# Patient Record
Sex: MALE | Race: WHITE | URBAN - METROPOLITAN AREA
[De-identification: names, ages, dates, MRNs, and addresses within clinical notes are randomized per-mention and may not be internally consistent; named-entity substitution may affect disease eponyms.]

---

## 2023-01-11 ENCOUNTER — OFFICE VISIT (OUTPATIENT)
Dept: INTERNAL MEDICINE CLINIC | Facility: CLINIC | Age: 38
End: 2023-01-11
Payer: COMMERCIAL

## 2023-01-11 VITALS
DIASTOLIC BLOOD PRESSURE: 68 MMHG | HEART RATE: 78 BPM | HEIGHT: 68 IN | BODY MASS INDEX: 28.61 KG/M2 | RESPIRATION RATE: 18 BRPM | SYSTOLIC BLOOD PRESSURE: 124 MMHG | WEIGHT: 188.81 LBS | OXYGEN SATURATION: 97 %

## 2023-01-11 DIAGNOSIS — Z13.1 DIABETES MELLITUS SCREENING: ICD-10-CM

## 2023-01-11 DIAGNOSIS — Z13.0 SCREENING FOR BLOOD DISEASE: ICD-10-CM

## 2023-01-11 DIAGNOSIS — F33.42 RECURRENT MAJOR DEPRESSIVE DISORDER, IN FULL REMISSION (HCC): ICD-10-CM

## 2023-01-11 DIAGNOSIS — Z13.29 THYROID DISORDER SCREEN: ICD-10-CM

## 2023-01-11 DIAGNOSIS — R21 RASH AND NONSPECIFIC SKIN ERUPTION: ICD-10-CM

## 2023-01-11 DIAGNOSIS — Z13.220 LIPID SCREENING: ICD-10-CM

## 2023-01-11 DIAGNOSIS — F41.1 GENERALIZED ANXIETY DISORDER: ICD-10-CM

## 2023-01-11 DIAGNOSIS — Z00.00 ANNUAL PHYSICAL EXAM: Primary | ICD-10-CM

## 2023-01-11 PROCEDURE — 3008F BODY MASS INDEX DOCD: CPT | Performed by: PHYSICIAN ASSISTANT

## 2023-01-11 PROCEDURE — 3074F SYST BP LT 130 MM HG: CPT | Performed by: PHYSICIAN ASSISTANT

## 2023-01-11 PROCEDURE — 3078F DIAST BP <80 MM HG: CPT | Performed by: PHYSICIAN ASSISTANT

## 2023-01-11 PROCEDURE — 99385 PREV VISIT NEW AGE 18-39: CPT | Performed by: PHYSICIAN ASSISTANT

## 2023-01-11 PROCEDURE — 99202 OFFICE O/P NEW SF 15 MIN: CPT | Performed by: PHYSICIAN ASSISTANT

## 2023-01-11 RX ORDER — DULOXETIN HYDROCHLORIDE 60 MG/1
60 CAPSULE, DELAYED RELEASE ORAL 2 TIMES DAILY
COMMUNITY
Start: 2022-12-23

## 2023-01-11 RX ORDER — CLOTRIMAZOLE AND BETAMETHASONE DIPROPIONATE 10; .64 MG/G; MG/G
1 CREAM TOPICAL 2 TIMES DAILY
Qty: 45 G | Refills: 0 | Status: SHIPPED | OUTPATIENT
Start: 2023-01-11

## 2023-01-11 RX ORDER — BUSPIRONE HYDROCHLORIDE 10 MG/1
10 TABLET ORAL 2 TIMES DAILY
COMMUNITY
Start: 2022-12-23

## 2023-10-18 ENCOUNTER — TELEPHONE (OUTPATIENT)
Dept: INTERNAL MEDICINE CLINIC | Facility: CLINIC | Age: 38
End: 2023-10-18

## 2023-10-18 NOTE — TELEPHONE ENCOUNTER
Orders to     Michelle Sanchez        Pt aware to get labs done no sooner than 2 weeks prior to the appt. Pt aware to fast.  No call back required.     Future Appointments   Date Time Provider Kalyani Garcia   12/6/2023 10:30 AM Alisha Lopez PA-C [facility]

## 2023-10-18 NOTE — TELEPHONE ENCOUNTER
[facility] calling for refills for pt of below. [address] [phone], [phone] [[id number]]             Medication Quantity Refills Start End   busPIRone 10 MG Oral Tab   12/23/2022    Sig:   Take 10 mg by mouth 2 (two) times daily. Route:   Oral              DULoxetine 60 MG Oral Cap DR Particles   12/23/2022    Sig:   Take 60 mg by mouth 2 (two) times daily.      Route:   Oral

## 2023-10-19 ENCOUNTER — PATIENT MESSAGE (OUTPATIENT)
Dept: INTERNAL MEDICINE CLINIC | Facility: CLINIC | Age: 38
End: 2023-10-19

## 2023-10-19 RX ORDER — DULOXETIN HYDROCHLORIDE 60 MG/1
60 CAPSULE, DELAYED RELEASE ORAL 2 TIMES DAILY
Qty: 180 CAPSULE | Refills: 0 | Status: SHIPPED | OUTPATIENT
Start: 2023-10-19

## 2023-10-19 RX ORDER — BUSPIRONE HYDROCHLORIDE 10 MG/1
10 TABLET ORAL 2 TIMES DAILY
Qty: 180 TABLET | Refills: 0 | OUTPATIENT
Start: 2023-10-19

## 2023-10-19 RX ORDER — DULOXETIN HYDROCHLORIDE 60 MG/1
60 CAPSULE, DELAYED RELEASE ORAL 2 TIMES DAILY
Refills: 0 | Status: CANCELLED | OUTPATIENT
Start: 2023-10-19

## 2023-10-19 RX ORDER — DULOXETIN HYDROCHLORIDE 60 MG/1
60 CAPSULE, DELAYED RELEASE ORAL 2 TIMES DAILY
Qty: 180 CAPSULE | Refills: 0 | OUTPATIENT
Start: 2023-10-19

## 2023-10-19 RX ORDER — BUSPIRONE HYDROCHLORIDE 10 MG/1
10 TABLET ORAL 2 TIMES DAILY
Qty: 180 TABLET | Refills: 0 | Status: SHIPPED | OUTPATIENT
Start: 2023-10-19

## 2023-10-19 RX ORDER — BUSPIRONE HYDROCHLORIDE 10 MG/1
10 TABLET ORAL 2 TIMES DAILY
Refills: 0 | Status: CANCELLED | OUTPATIENT
Start: 2023-10-19

## 2023-10-19 NOTE — TELEPHONE ENCOUNTER
From: Monica Iglesias  To: Oziel Cardenas  Sent: 10/19/2023 9:36 AM CDT  Subject: medication    Hello, I have submitted a medication request and it automatically went to S[facility] Castro. I wanted to let you know because you are the Dr that I see. I wanted to make sure it got seen because I am about to run out of this medication. Thank you!

## 2023-10-19 NOTE — TELEPHONE ENCOUNTER
Refills have been requested for the following medications:         DULoxetine 60 MG Oral Cap DR Particles      Patient Comment: Old doctor was from [place] and new one is [place] so pharmacy said call the doctor doctor said have pharmacy submit refill request pharmacy said they would call. Waited 24 hours and now pharmacy says submit through [address] and say they can't contact because old doctor is out of state I think. Not sure if this is what was meant?         busPIRone 10 MG Oral Tab      Patient Comment: Old doctor was from [place] and new one is [place] so pharmacy said call the doctor doctor said have pharmacy submit refill request pharmacy said they would call. Waited 24 hours and now pharmacy says submit through [address] and say they can't contact because old doctor is out of state I think. Not sure if this is what was meant?      Preferred pharmacy: [facility] [address], [address] [phone], [phone]

## 2023-10-20 RX ORDER — DULOXETIN HYDROCHLORIDE 60 MG/1
60 CAPSULE, DELAYED RELEASE ORAL 2 TIMES DAILY
Qty: 180 CAPSULE | Refills: 0 | OUTPATIENT
Start: 2023-10-20 | End: 2024-01-18

## 2023-10-20 RX ORDER — BUSPIRONE HYDROCHLORIDE 10 MG/1
10 TABLET ORAL 3 TIMES DAILY
Qty: 270 TABLET | Refills: 0 | OUTPATIENT
Start: 2023-10-20 | End: 2024-10-14

## 2024-01-08 ENCOUNTER — TELEPHONE (OUTPATIENT)
Dept: INTERNAL MEDICINE CLINIC | Facility: CLINIC | Age: 39
End: 2024-01-08

## 2024-01-08 DIAGNOSIS — Z13.220 SCREENING FOR LIPID DISORDERS: ICD-10-CM

## 2024-01-08 DIAGNOSIS — Z13.228 SCREENING FOR METABOLIC DISORDER: ICD-10-CM

## 2024-01-08 DIAGNOSIS — Z13.0 SCREENING FOR DISORDER OF BLOOD AND BLOOD-FORMING ORGANS: ICD-10-CM

## 2024-01-08 DIAGNOSIS — Z13.29 SCREENING FOR THYROID DISORDER: ICD-10-CM

## 2024-01-08 DIAGNOSIS — Z00.00 ROUTINE GENERAL MEDICAL EXAMINATION AT A HEALTH CARE FACILITY: Primary | ICD-10-CM

## 2024-01-17 ENCOUNTER — OFFICE VISIT (OUTPATIENT)
Dept: INTERNAL MEDICINE CLINIC | Facility: CLINIC | Age: 39
End: 2024-01-17
Payer: COMMERCIAL

## 2024-01-17 VITALS
HEIGHT: 67 IN | WEIGHT: 196 LBS | OXYGEN SATURATION: 98 % | BODY MASS INDEX: 30.76 KG/M2 | SYSTOLIC BLOOD PRESSURE: 124 MMHG | HEART RATE: 102 BPM | RESPIRATION RATE: 22 BRPM | DIASTOLIC BLOOD PRESSURE: 84 MMHG

## 2024-01-17 DIAGNOSIS — Z00.00 ANNUAL PHYSICAL EXAM: Primary | ICD-10-CM

## 2024-01-17 DIAGNOSIS — F33.42 RECURRENT MAJOR DEPRESSIVE DISORDER, IN FULL REMISSION (HCC): ICD-10-CM

## 2024-01-17 DIAGNOSIS — Z23 NEED FOR INFLUENZA VACCINATION: ICD-10-CM

## 2024-01-17 DIAGNOSIS — Z23 NEED FOR TDAP VACCINATION: ICD-10-CM

## 2024-01-17 DIAGNOSIS — F41.1 GENERALIZED ANXIETY DISORDER: ICD-10-CM

## 2024-01-17 PROCEDURE — 90686 IIV4 VACC NO PRSV 0.5 ML IM: CPT | Performed by: PHYSICIAN ASSISTANT

## 2024-01-17 PROCEDURE — 3008F BODY MASS INDEX DOCD: CPT | Performed by: PHYSICIAN ASSISTANT

## 2024-01-17 PROCEDURE — 90471 IMMUNIZATION ADMIN: CPT | Performed by: PHYSICIAN ASSISTANT

## 2024-01-17 PROCEDURE — 3074F SYST BP LT 130 MM HG: CPT | Performed by: PHYSICIAN ASSISTANT

## 2024-01-17 PROCEDURE — 90472 IMMUNIZATION ADMIN EACH ADD: CPT | Performed by: PHYSICIAN ASSISTANT

## 2024-01-17 PROCEDURE — 99395 PREV VISIT EST AGE 18-39: CPT | Performed by: PHYSICIAN ASSISTANT

## 2024-01-17 PROCEDURE — 90715 TDAP VACCINE 7 YRS/> IM: CPT | Performed by: PHYSICIAN ASSISTANT

## 2024-01-17 PROCEDURE — 3079F DIAST BP 80-89 MM HG: CPT | Performed by: PHYSICIAN ASSISTANT

## 2024-01-17 RX ORDER — BUSPIRONE HYDROCHLORIDE 10 MG/1
10 TABLET ORAL 2 TIMES DAILY
Qty: 180 TABLET | Refills: 3 | Status: SHIPPED | OUTPATIENT
Start: 2024-01-17

## 2024-01-17 RX ORDER — DULOXETIN HYDROCHLORIDE 60 MG/1
60 CAPSULE, DELAYED RELEASE ORAL 2 TIMES DAILY
Qty: 180 CAPSULE | Refills: 3 | Status: SHIPPED | OUTPATIENT
Start: 2024-01-17

## 2024-01-17 NOTE — PROGRESS NOTES
Chief Complaint   Patient presents with    Physical     Room # 6 KB    Care Gap Management       Due for tdap       HPI:  Pt presents for annual physical.      Anxiety and depression - pt is compliant with meds and denies issues.  He feels his sxs are well controlled.  Denies SEs with meds.    Pt is active but not exercising regularly.      Pt thinks he had a COVID booster this fall.  Had it before going on a cruise.  Will check with his wife.  Thinks he may have had a tetanus shot in the last 10 years but will check with wife.  Has not had flu shot yet this season.    Next child due in April, a boy.  Has 10 YO twin boys.     Has not done screening labs last year or this year yet.    Review of Systems   Constitutional: Negative for fever, chills and fatigue. No distress.  HENT: Negative for hearing loss, congestion, sore throat, neck pain and dental problem.    Eyes: Negative for pain and visual disturbance.   Respiratory: Negative for cough, chest tightness, shortness of breath and wheezing.    Cardiovascular: Negative for chest pain, palpitations and leg swelling.   Gastrointestinal: Negative for nausea, vomiting, abdominal pain, diarrhea, blood in stool   Genitourinary: Negative for dysuria, hematuria and difficulty urinating.   Musculoskeletal: Negative for myalgias, back pain, joint swelling, arthralgias and gait problem.   Skin: Negative for color change and rash.   Neurological: Negative for dizziness, syncope, weakness, numbness, tingling and headaches.   Hematological: Negative for adenopathy. Does not bruise/bleed easily.   Psychiatric/Behavioral: The patient is not nervous/anxious. No depression.    Patient Active Problem List   Diagnosis    Generalized anxiety disorder    Recurrent major depressive disorder, in full remission ([facility])       History reviewed. No pertinent past medical history.  History reviewed. No pertinent surgical history.  History reviewed. No pertinent family history.  Social History      Socioeconomic History    Marital status:    Tobacco Use    Smoking status: Never    Smokeless tobacco: Never       Current Outpatient Medications   Medication Sig Dispense Refill    DULoxetine 60 MG Oral Cap DR Particles Take 1 capsule (60 mg total) by mouth 2 (two) times daily. 180 capsule 3    busPIRone 10 MG Oral Tab Take 1 tablet (10 mg total) by mouth 2 (two) times daily. 180 tablet 3       Allergies  No Known Allergies    Health Maintenance  Immunization History   Administered Date(s) Administered    FLUZONE 6 months and older PFS 0.5 ml (31496) 01/17/2024    TDAP 01/17/2024     Dental Visits:  Overdue    Physical Exam  /84   Pulse 102   Resp 22   Ht 5' 7\" (1.702 m)   Wt 196 lb (88.9 kg)   SpO2 98%   BMI 30.70 kg/m²   Constitutional: Oriented to person, place, and time. No distress.   HEENT:  Normocephalic and atraumatic. Tympanic membranes normal.  Nose normal. Oropharynx is clear and moist.   Eyes: Conjunctivae are normal. PERRLA. No scleral icterus.   Neck: Normal range of motion. Neck supple. No carotid bruits bilaterally. No edema present.  Cardiovascular: Normal rate, regular rhythm and intact distal pulses.  No murmur, rubs or gallops.   Pulmonary/Chest: Effort normal and breath sounds normal. No respiratory distress. No wheezes, rhonchi or rales  Abdominal: Soft. Bowel sounds are normal. Non tender, no masses, no organomegaly or hernias.  :  Circumcised male, 2 descended testes.  No penile lesions or masses.  No testicular masses.  No hernias noted bilaterally  Musculoskeletal: Normal range of motion. No edema and no tenderness.  No effusions.  Lymphadenopathy: No cervical adenopathy.   Neurological: Normal reflexes. No cranial nerve deficit or sensory deficit. Normal muscle tone. Coordination normal.   Skin: Skin is warm and dry. No rash noted. No erythema. No pallor.   Psychiatric: Normal mood and affect.     A/P:    Encounter Diagnoses   Name Primary?    Annual physical exam  - will do screening labs today.  Tdap and flu shot today.  COVID booster already done per pt, asked him to send record through [employer] (states his wife has at home).  Encouraged regular cardiovascular exercise, ideally a minimum of 30 minutes 5 times a week.  Reviewed RFs for CVD and need to control those for prevention of CVD in the future.   Yes    Recurrent major depressive disorder, in full remission (HCC) - controlled on Cymbalta, continue.       Generalized anxiety disorder - controlled on Buspar, continue.     Need for influenza vaccination     Need for Tdap vaccination        Orders Placed This Encounter   Procedures    Fluzone Quadrivalent 6mo+ 0.5mL    TdaP (Adacel, Boostrix) [57352]       Meds & Refills for this Visit:  Requested Prescriptions     Signed Prescriptions Disp Refills    DULoxetine 60 MG Oral Cap DR Particles 180 capsule 3     Sig: Take 1 capsule (60 mg total) by mouth 2 (two) times daily.    busPIRone 10 MG Oral Tab 180 tablet 3     Sig: Take 1 tablet (10 mg total) by mouth 2 (two) times daily.       Imaging & Consults:  INFLUENZA VAC, QUAD, PRSV FREE, 0.5 ML  TETANUS, DIPHTHERIA TOXOIDS AND ACELLULAR PERTUSIS VACCINE (TDAP), >7 YEARS, IM USE      No follow-ups on file.    There are no Patient Instructions on file for this visit.    All questions were answered and the patient understands the plan.

## 2024-03-19 ENCOUNTER — LAB ENCOUNTER (OUTPATIENT)
Dept: LAB | Age: 39
End: 2024-03-19
Attending: PHYSICIAN ASSISTANT
Payer: COMMERCIAL

## 2024-03-19 DIAGNOSIS — Z00.00 ROUTINE GENERAL MEDICAL EXAMINATION AT A HEALTH CARE FACILITY: ICD-10-CM

## 2024-03-19 DIAGNOSIS — Z13.228 SCREENING FOR METABOLIC DISORDER: ICD-10-CM

## 2024-03-19 DIAGNOSIS — Z13.0 SCREENING FOR DISORDER OF BLOOD AND BLOOD-FORMING ORGANS: ICD-10-CM

## 2024-03-19 DIAGNOSIS — Z13.220 SCREENING FOR LIPID DISORDERS: ICD-10-CM

## 2024-03-19 DIAGNOSIS — Z13.29 SCREENING FOR THYROID DISORDER: ICD-10-CM

## 2024-03-19 LAB
ALBUMIN SERPL-MCNC: 4.6 G/DL (ref 3.4–5)
ALBUMIN/GLOB SERPL: 1.4 {RATIO} (ref 1–2)
ALP LIVER SERPL-CCNC: 67 U/L
ALT SERPL-CCNC: 65 U/L
ANION GAP SERPL CALC-SCNC: 2 MMOL/L (ref 0–18)
AST SERPL-CCNC: 30 U/L (ref 15–37)
BASOPHILS # BLD AUTO: 0.06 X10(3) UL (ref 0–0.2)
BASOPHILS NFR BLD AUTO: 0.7 %
BILIRUB SERPL-MCNC: 0.6 MG/DL (ref 0.1–2)
BUN BLD-MCNC: 16 MG/DL (ref 9–23)
CALCIUM BLD-MCNC: 9.4 MG/DL (ref 8.5–10.1)
CHLORIDE SERPL-SCNC: 105 MMOL/L (ref 98–112)
CHOLEST SERPL-MCNC: 278 MG/DL (ref ?–200)
CO2 SERPL-SCNC: 28 MMOL/L (ref 21–32)
CREAT BLD-MCNC: 1.24 MG/DL
EGFRCR SERPLBLD CKD-EPI 2021: 77 ML/MIN/1.73M2 (ref 60–?)
EOSINOPHIL # BLD AUTO: 0.21 X10(3) UL (ref 0–0.7)
EOSINOPHIL NFR BLD AUTO: 2.3 %
ERYTHROCYTE [DISTWIDTH] IN BLOOD BY AUTOMATED COUNT: 12 %
FASTING PATIENT LIPID ANSWER: YES
FASTING STATUS PATIENT QL REPORTED: YES
GLOBULIN PLAS-MCNC: 3.4 G/DL (ref 2.8–4.4)
GLUCOSE BLD-MCNC: 94 MG/DL (ref 70–99)
HCT VFR BLD AUTO: 49.5 %
HDLC SERPL-MCNC: 38 MG/DL (ref 40–59)
HGB BLD-MCNC: 17 G/DL
IMM GRANULOCYTES # BLD AUTO: 0.03 X10(3) UL (ref 0–1)
IMM GRANULOCYTES NFR BLD: 0.3 %
LDLC SERPL CALC-MCNC: 170 MG/DL (ref ?–100)
LYMPHOCYTES # BLD AUTO: 3.38 X10(3) UL (ref 1–4)
LYMPHOCYTES NFR BLD AUTO: 37.2 %
MCH RBC QN AUTO: 30.1 PG (ref 26–34)
MCHC RBC AUTO-ENTMCNC: 34.3 G/DL (ref 31–37)
MCV RBC AUTO: 87.8 FL
MONOCYTES # BLD AUTO: 0.66 X10(3) UL (ref 0.1–1)
MONOCYTES NFR BLD AUTO: 7.3 %
NEUTROPHILS # BLD AUTO: 4.74 X10 (3) UL (ref 1.5–7.7)
NEUTROPHILS # BLD AUTO: 4.74 X10(3) UL (ref 1.5–7.7)
NEUTROPHILS NFR BLD AUTO: 52.2 %
NONHDLC SERPL-MCNC: 240 MG/DL (ref ?–130)
OSMOLALITY SERPL CALC.SUM OF ELEC: 281 MOSM/KG (ref 275–295)
PLATELET # BLD AUTO: 320 10(3)UL (ref 150–450)
POTASSIUM SERPL-SCNC: 4 MMOL/L (ref 3.5–5.1)
PROT SERPL-MCNC: 8 G/DL (ref 6.4–8.2)
RBC # BLD AUTO: 5.64 X10(6)UL
SODIUM SERPL-SCNC: 135 MMOL/L (ref 136–145)
TRIGL SERPL-MCNC: 359 MG/DL (ref 30–149)
TSI SER-ACNC: 2.16 MIU/ML (ref 0.36–3.74)
VLDLC SERPL CALC-MCNC: 74 MG/DL (ref 0–30)
WBC # BLD AUTO: 9.1 X10(3) UL (ref 4–11)

## 2024-03-19 PROCEDURE — 80061 LIPID PANEL: CPT | Performed by: PHYSICIAN ASSISTANT

## 2024-03-19 PROCEDURE — 80050 GENERAL HEALTH PANEL: CPT | Performed by: PHYSICIAN ASSISTANT

## 2024-04-02 NOTE — PROGRESS NOTES
Chief Complaint   Patient presents with    Lab Results     Rm 6 kb         HPI:  Pt presents for follow up of abnormal labs.    Pt admits to poor diet.  Wife plans meals and pt does the cooking.  Pt has been trying to increase exercise with goal of 150 min weekly.   Weight trending up over the last year or so.  Down a pound from January OV.     Review of Systems   No f/c/chest pain or sob. No cough. No n/v/d. No ha or dizziness. No other complaints today.    No past medical history on file.    Patient Active Problem List   Diagnosis    Generalized anxiety disorder    Recurrent major depressive disorder, in full remission (HCC)       Current Outpatient Medications   Medication Sig Dispense Refill    DULoxetine 60 MG Oral Cap DR Particles Take 1 capsule (60 mg total) by mouth 2 (two) times daily. 180 capsule 3    busPIRone 10 MG Oral Tab Take 1 tablet (10 mg total) by mouth 2 (two) times daily. 180 tablet 3       Physical Exam  /62   Pulse 116   Resp 22   Ht 5' 7\" (1.702 m)   Wt 195 lb 3.2 oz (88.5 kg)   SpO2 98%   BMI 30.57 kg/m²   Constitutional:  No distress.   HEENT:  Normocephalic and atraumatic.  Skin: Skin is warm and dry. No rash noted. No erythema. No pallor.     [facility] Lab Encounter on 03/19/2024   Component Date Value Ref Range Status    Glucose 03/19/2024 94  70 - 99 mg/dL Final    Sodium 03/19/2024 135 (L)  136 - 145 mmol/L Final    Potassium 03/19/2024 4.0  3.5 - 5.1 mmol/L Final    Chloride 03/19/2024 105  98 - 112 mmol/L Final    CO2 03/19/2024 28.0  21.0 - 32.0 mmol/L Final    Anion Gap 03/19/2024 2  0 - 18 mmol/L Final    BUN 03/19/2024 16  9 - 23 mg/dL Final    Creatinine 03/19/2024 1.24  0.70 - 1.30 mg/dL Final    Calcium, Total 03/19/2024 9.4  8.5 - 10.1 mg/dL Final    Calculated Osmolality 03/19/2024 281  275 - 295 mOsm/kg Final    eGFR-Cr 03/19/2024 77  >=60 mL/min/1.73m2 Final    AST 03/19/2024 30  15 - 37 U/L Final    ALT 03/19/2024 65 (H)  16 - 61 U/L Final    Alkaline Phosphatase  03/19/2024 67  45 - 117 U/L Final    Bilirubin, Total 03/19/2024 0.6  0.1 - 2.0 mg/dL Final    Total Protein 03/19/2024 8.0  6.4 - 8.2 g/dL Final    Albumin 03/19/2024 4.6  3.4 - 5.0 g/dL Final    Globulin  03/19/2024 3.4  2.8 - 4.4 g/dL Final    A/G Ratio 03/19/2024 1.4  1.0 - 2.0 Final    Patient Fasting for CMP? 03/19/2024 Yes   Final    Cholesterol, Total 03/19/2024 278 (H)  <200 mg/dL Final    HDL Cholesterol 03/19/2024 38 (L)  40 - 59 mg/dL Final    Triglycerides 03/19/2024 359 (H)  30 - 149 mg/dL Final    LDL Cholesterol 03/19/2024 170 (H)  <100 mg/dL Final    VLDL 03/19/2024 74 (H)  0 - 30 mg/dL Final    Non HDL Chol 03/19/2024 240 (H)  <130 mg/dL Final    Patient Fasting for Lipid? 03/19/2024 Yes   Final    TSH 03/19/2024 2.160  0.358 - 3.740 mIU/mL Final    WBC 03/19/2024 9.1  4.0 - 11.0 x10(3) uL Final    RBC 03/19/2024 5.64  4.30 - 5.70 x10(6)uL Final    HGB 03/19/2024 17.0  13.0 - 17.5 g/dL Final    HCT 03/19/2024 49.5  39.0 - 53.0 % Final    PLT 03/19/2024 320.0  150.0 - 450.0 10(3)uL Final    MCV 03/19/2024 87.8  80.0 - 100.0 fL Final    MCH 03/19/2024 30.1  26.0 - 34.0 pg Final    MCHC 03/19/2024 34.3  31.0 - 37.0 g/dL Final    RDW 03/19/2024 12.0  % Final    Neutrophil Absolute Prelim 03/19/2024 4.74  1.50 - 7.70 x10 (3) uL Final    Neutrophil Absolute 03/19/2024 4.74  1.50 - 7.70 x10(3) uL Final    Lymphocyte Absolute 03/19/2024 3.38  1.00 - 4.00 x10(3) uL Final    Monocyte Absolute 03/19/2024 0.66  0.10 - 1.00 x10(3) uL Final    Eosinophil Absolute 03/19/2024 0.21  0.00 - 0.70 x10(3) uL Final    Basophil Absolute 03/19/2024 0.06  0.00 - 0.20 x10(3) uL Final    Immature Granulocyte Absolute 03/19/2024 0.03  0.00 - 1.00 x10(3) uL Final    Neutrophil % 03/19/2024 52.2  % Final    Lymphocyte % 03/19/2024 37.2  % Final    Monocyte % 03/19/2024 7.3  % Final    Eosinophil % 03/19/2024 2.3  % Final    Basophil % 03/19/2024 0.7  % Final    Immature Granulocyte % 03/19/2024 0.3  % Final          A/P:    Encounter Diagnoses   Name Primary?    Transaminitis - suspect fatty liver.  Discussed diet, exercise wt loss.  Repeat CMP in 3 mos as well as ferritin and acute hepatitis panel.  If remains elevated then will need to check abd US. Yes    Dyslipidemia - Pt reports not fasting for labs.  Pt will work on diet, exercise, wt loss.  Repeat labs in 3 mos.  If remains elevated then will need statin.  Refer to Dietitian.  Discussed diet choices that will improve chol.     Hyponatremia - mild, repeat with next labs.        Code selection for this visit was based on time spent (33 min) on date of service in preparing to see the patient, obtaining and/or reviewing separately obtained history, performing a medically appropriate examination, counseling and educating the patient/family/caregiver, ordering medications or testing, referring and communicating with other healthcare providers, documenting clinical information in the EHR, independently interpreting results and communicating results to the patient/family/caregiver and care coordination with the patient's other providers.      Orders Placed This Encounter   Procedures    Comp Metabolic Panel (14) [E]    Lipid Panel [E]    Ferritin [E]    Hepatitis Panel, Acute (4) [E]       Meds & Refills for this Visit:  Requested Prescriptions      No prescriptions requested or ordered in this encounter       Imaging & Consults:  DIETITIAN EDUCATION INITIAL, DIET (INTERNAL)    Return in about 9 months (around 1/3/2025), or if symptoms worsen or fail to improve, for Annual physical.  There are no Patient Instructions on file for this visit.    All questions were answered and the patient understands the plan.

## 2024-04-03 ENCOUNTER — OFFICE VISIT (OUTPATIENT)
Dept: INTERNAL MEDICINE CLINIC | Facility: CLINIC | Age: 39
End: 2024-04-03
Payer: COMMERCIAL

## 2024-04-03 VITALS
HEIGHT: 67 IN | WEIGHT: 195.19 LBS | OXYGEN SATURATION: 98 % | HEART RATE: 116 BPM | SYSTOLIC BLOOD PRESSURE: 118 MMHG | DIASTOLIC BLOOD PRESSURE: 62 MMHG | BODY MASS INDEX: 30.64 KG/M2 | RESPIRATION RATE: 22 BRPM

## 2024-04-03 DIAGNOSIS — R74.01 TRANSAMINITIS: Primary | ICD-10-CM

## 2024-04-03 DIAGNOSIS — E78.5 DYSLIPIDEMIA: ICD-10-CM

## 2024-04-03 DIAGNOSIS — E87.1 HYPONATREMIA: ICD-10-CM

## 2024-04-03 PROCEDURE — 3008F BODY MASS INDEX DOCD: CPT | Performed by: PHYSICIAN ASSISTANT

## 2024-04-03 PROCEDURE — 99214 OFFICE O/P EST MOD 30 MIN: CPT | Performed by: PHYSICIAN ASSISTANT

## 2024-04-03 PROCEDURE — 3078F DIAST BP <80 MM HG: CPT | Performed by: PHYSICIAN ASSISTANT

## 2024-04-03 PROCEDURE — 3074F SYST BP LT 130 MM HG: CPT | Performed by: PHYSICIAN ASSISTANT

## 2024-10-27 ENCOUNTER — APPOINTMENT (OUTPATIENT)
Dept: GENERAL RADIOLOGY | Age: 39
End: 2024-10-27
Attending: NURSE PRACTITIONER
Payer: COMMERCIAL

## 2024-10-27 ENCOUNTER — HOSPITAL ENCOUNTER (OUTPATIENT)
Age: 39
Discharge: HOME | End: 2024-10-27
Payer: COMMERCIAL

## 2024-10-27 VITALS
HEIGHT: 66 IN | WEIGHT: 200 LBS | SYSTOLIC BLOOD PRESSURE: 132 MMHG | TEMPERATURE: 98 F | HEART RATE: 103 BPM | RESPIRATION RATE: 18 BRPM | BODY MASS INDEX: 32.14 KG/M2 | DIASTOLIC BLOOD PRESSURE: 86 MMHG | OXYGEN SATURATION: 95 %

## 2024-10-27 DIAGNOSIS — J18.9 COMMUNITY ACQUIRED PNEUMONIA OF RIGHT MIDDLE LOBE OF LUNG: Primary | ICD-10-CM

## 2024-10-27 PROCEDURE — 71046 X-RAY EXAM CHEST 2 VIEWS: CPT | Performed by: NURSE PRACTITIONER

## 2024-10-27 PROCEDURE — 99204 OFFICE O/P NEW MOD 45 MIN: CPT | Performed by: NURSE PRACTITIONER

## 2024-10-27 RX ORDER — AZITHROMYCIN 250 MG/1
TABLET, FILM COATED ORAL
Qty: 6 TABLET | Refills: 0 | Status: SHIPPED | OUTPATIENT
Start: 2024-10-27 | End: 2024-11-01

## 2024-10-27 NOTE — ED PROVIDER NOTES
Patient Seen in: [facility]      History     Chief Complaint   Patient presents with    Fever    Cough    Shortness Of Breath    Body ache and/or chills     Stated Complaint: shortness of breath; fever; cough; sore throat chills    Subjective:   38-year-old male presents today with flulike symptoms for about 6 to 7 days.  States kids at home have had similar symptoms.  Denies any shortness breath wheezing.  No nausea vomiting diarrhea.  Is afebrile on arrival here.  No other symptoms or concerns.  The patient's medication list, past medical history and social history elements as listed in today's nurse's notes were reviewed and agreed (except as otherwise stated in the HPI).  The patient's family history reviewed and determined to be noncontributory to the presenting problem              Objective:     History reviewed. No pertinent past medical history.           History reviewed. No pertinent surgical history.             Social History     Socioeconomic History    Marital status:    Tobacco Use    Smoking status: Never    Smokeless tobacco: Never              Review of Systems    Positive for stated complaint: shortness of breath; fever; cough; sore throat chills  Other systems are as noted in HPI.  Constitutional and vital signs reviewed.      All other systems reviewed and negative except as noted above.    Physical Exam     ED Triage Vitals [10/27/24 1245]   /86   Pulse 103   Resp 18   Temp 98.1 °F (36.7 °C)   Temp src Temporal   SpO2 95 %   O2 Device None (Room air)       Current Vitals:   Vital Signs  BP: 132/86  Pulse: 103  Resp: 18  Temp: 98.1 °F (36.7 °C)  Temp src: Temporal    Oxygen Therapy  SpO2: 95 %  O2 Device: None (Room air)        Physical Exam  Vitals and nursing note reviewed.   Constitutional:       Appearance: He is well-developed.   HENT:      Head: Normocephalic.      Right Ear: Tympanic membrane and ear canal normal.      Left Ear: Tympanic membrane and ear canal  normal.      Nose: Mucosal edema, congestion and rhinorrhea present.      Mouth/Throat:      Pharynx: Uvula midline. Posterior oropharyngeal erythema present.   Eyes:      Conjunctiva/sclera: Conjunctivae normal.      Pupils: Pupils are equal, round, and reactive to light.   Cardiovascular:      Rate and Rhythm: Normal rate and regular rhythm.   Pulmonary:      Effort: Pulmonary effort is normal.      Breath sounds: Normal breath sounds.   Musculoskeletal:      Cervical back: Normal range of motion and neck supple.   Skin:     General: Skin is warm and dry.   Neurological:      Mental Status: He is alert and oriented to person, place, and time.             ED Course   Labs Reviewed - No data to display         XR CHEST PA + LAT CHEST (CPT=71046)    Result Date: 10/27/2024  PROCEDURE:  XR CHEST PA + LAT CHEST (CPT=71046)  INDICATIONS:  shortness of breath; fever; cough; sore throat chills  COMPARISON:  None.  TECHNIQUE:  PA and lateral chest radiographs were obtained.  PATIENT STATED HISTORY: (As transcribed by Technologist)  Patient with  shortness of breath, fever, cough, sore throat and chills for a week. He has a child with pneumonia at home.              CONCLUSION:    Focal pneumonia with a focal consolidation in the posterior aspect superior segment right lower lobe.  Interstitial markings mildly accentuated bilaterally.  Normal heart size.  No pleural effusion or pneumothorax.  No hyperinflation.  No mediastinal widening.  Chest x-ray follow-up should be changed to show clearance of this and exclude any underlying central lung lesion. LOCATION:  [id number]   Dictated by (CST): Toño Nevarez MD on 10/27/2024 at 1:24 PM     Finalized by (CST): Toño Nevarez MD on 10/27/2024 at 1:26 PM            [facility]     Please note that this report has been produced using speech recognition software and may contain errors related to that system including, but not limited to, errors in grammar, punctuation, and spelling, as well  as words and phrases that possibly may have been recognized inappropriately.  If there are any questions or concerns, contact the dictating provider for clarification.              Medical Decision Making  Differential diagnosis includes but is not limited to: COVID-19, viral URI, strep throat, influenza, pneumonia, sinusitis, bronchitis      Presented today with URI symptoms worsening cough.  Chest x-ray does show consolidation of the right middle lobe.  Will treat with azithromycin and Augmentin to take as directed.  Supportive care discussed.  To follow-up primary care physician 1 week for reevaluation.  Patient verbalized understanding and agreed to plan of care.    Amount and/or Complexity of Data Reviewed  Radiology: ordered and independent interpretation performed. Decision-making details documented in ED Course.     Details: Chest x-ray    Risk  OTC drugs.  Prescription drug management.        Disposition and Plan     Clinical Impression:  1. Community acquired pneumonia of right middle lobe of lung         Disposition:  Discharge  10/27/2024  1:29 pm    Follow-up:  Zac Leon MD  [address] [place] [address]  [phone]    In 1 week  As needed          Medications Prescribed:  Current Discharge Medication List        START taking these medications    Details   azithromycin (ZITHROMAX Z-BERNARDO) 250 MG Oral Tab 500 mg once followed by 250 mg daily x 4 days  Qty: 6 tablet, Refills: 0      amoxicillin clavulanate 875-125 MG Oral Tab Take 1 tablet by mouth 2 (two) times daily for 7 days.  Qty: 14 tablet, Refills: 0                 Supplementary Documentation:

## 2024-11-05 ENCOUNTER — TELEPHONE (OUTPATIENT)
Dept: INTERNAL MEDICINE CLINIC | Facility: CLINIC | Age: 39
End: 2024-11-05

## 2024-11-05 NOTE — TELEPHONE ENCOUNTER
Triage call transferred.   Spoke with pt, seen at  10/27/24 for cough, fever, SOB, chills. Fever and chills have subsided. SOB in morning during coughing episodes and subsides. Cough still present x2 weeks, not worsening. Chest XR showed consolidation of the right middle lobe and was treated with azithromycin and Augmentin.   Pt has f/u visit:  Future Appointments   Date Time Provider Department Center   11/6/2024  2:30 PM Mary Grubbs PA-C [facility]     Reviewed supportive measures- OTC delsym, Robitussin at night. Warm salt water gargles, steam inhalation.   Pt inquiring if should get prescription to help with cough or other recommendations.   Informed will relay to CB for further recommendations.  No further questions or concerns. Pt verbalized understanding and agreed with POC.    Please advise. Thank you.

## 2024-11-11 NOTE — TELEPHONE ENCOUNTER
MC message last read by Hernán Kwong at  2:25 PM on 11/8/2024.     11/6 appt was cancelled, pt is rescheduled until 11/21 currently.  Future Appointments   Date Time Provider Department Center   11/21/2024 10:00 AM Mary Grubbs PA-C [facility]

## 2024-11-19 NOTE — PROGRESS NOTES
Chief Complaint   Patient presents with    Follow - Up     Pneumonia        HPI:  Pt presents with c/o persistent cough/PNA.  Pt was seen in  on 10/27/24 and diagnosed with RML pneumonia.  He was treated with Augmentin and Azithromycin.  Pt had an appt with me on 11/6/24 but pt cancelled that appt due to transportation.  Overall pt is improving.  Still with a cough that seems to be worse at night when laying down.  Denies f/c.  No CP or SOB.      Transaminitis - Trending down but persistent.  Acute hepatitis panel is negative and ferritin is normal.  Pt has lost about 8# since I saw him last, trying to be more active/get more exercise.      Hyperlipidemia - LDL trending down but remains elevated.  TGs trending up and are quite high.  Pt was not fasting for most recent labs.      Review of Systems   No f/c/chest pain or sob. No cough. No n/v/d. No ha or dizziness. No other complaints today.    No past medical history on file.    Patient Active Problem List   Diagnosis    Generalized anxiety disorder    Recurrent major depressive disorder, in full remission (HCC)       Current Outpatient Medications   Medication Sig Dispense Refill    atorvastatin 10 MG Oral Tab Take 1 tablet (10 mg total) by mouth nightly. 90 tablet 0    benzonatate 200 MG Oral Cap Take 1 capsule (200 mg total) by mouth 3 (three) times daily as needed for cough (Swallow whole). 20 capsule 0    DULoxetine 60 MG Oral Cap DR Particles Take 1 capsule (60 mg total) by mouth 2 (two) times daily. 180 capsule 3    busPIRone 10 MG Oral Tab Take 1 tablet (10 mg total) by mouth 2 (two) times daily. 180 tablet 3       Physical Exam  /74   Pulse 72   Temp 97 °F (36.1 °C) (Temporal)   Resp 18   Ht 5' 5.98\" (1.676 m)   Wt 187 lb (84.8 kg)   SpO2 97%   BMI 30.20 kg/m²   Constitutional:  No distress.   HEENT:  Normocephalic and atraumatic.   Eyes: Conjunctivae are normal.   Neck: Neck supple.   Cardiovascular: Normal rate, regular rhythm.  No murmur,  rubs or gallops.   Pulmonary/Chest: Effort normal and breath sounds normal. No respiratory distress. No wheezes, rhonchi or rales  Skin: Skin is warm and dry. No rash noted. No erythema. No pallor.       A/P:    Encounter Diagnoses   Name Primary?    Pneumonia of right middle lobe due to infectious organism - repeat CXR next week (4 weeks post treatment).  Try Tessalon perles prn cough.  F/U if cough not continuing to improve/resolve.   Yes    Mixed hyperlipidemia - HDL improved now normal.  LDL improved but still high.  Start Atorvastatin 10 mg daily.  Reviewed possible SEs.  Check labs and follow up in 3 mos.       Transaminitis - improving and now borderline.  Has lost 8#.  Will repeat in 3 mos.  Pt will continue to work on wt loss.  Will order abd US at next visit if LFTs not normalizing.       Screening for blood disease     Thyroid disorder screen      -   - check screening labs in 3 mos before CPE.      Code selection for this visit was based on time spent (36 min) on date of service in preparing to see the patient, obtaining and/or reviewing separately obtained history, performing a medically appropriate examination, counseling and educating the patient/family/caregiver, ordering medications or testing, referring and communicating with other healthcare providers, documenting clinical information in the EHR, independently interpreting results and communicating results to the patient/family/caregiver and care coordination with the patient's other providers.      Orders Placed This Encounter   Procedures    Lipid Panel [E]    CBC W Differential W Platelet [E]    Comp Metabolic Panel (14) [E]    TSH W Reflex To Free T4 [E]       Meds & Refills for this Visit:  Requested Prescriptions     Signed Prescriptions Disp Refills    atorvastatin 10 MG Oral Tab 90 tablet 0     Sig: Take 1 tablet (10 mg total) by mouth nightly.    benzonatate 200 MG Oral Cap 20 capsule 0     Sig: Take 1 capsule (200 mg total) by mouth 3  (three) times daily as needed for cough (Swallow whole).       Imaging & Consults:  XR CHEST PA + LAT CHEST (CPT=71046)    Return in about 3 months (around 2/21/2025) for Annual physical, cholesterol etc.  There are no Patient Instructions on file for this visit.    All questions were answered and the patient understands the plan.

## 2024-11-20 ENCOUNTER — LAB ENCOUNTER (OUTPATIENT)
Dept: LAB | Age: 39
End: 2024-11-20
Attending: PHYSICIAN ASSISTANT
Payer: COMMERCIAL

## 2024-11-20 DIAGNOSIS — E78.5 DYSLIPIDEMIA: ICD-10-CM

## 2024-11-20 DIAGNOSIS — E87.1 HYPONATREMIA: ICD-10-CM

## 2024-11-20 DIAGNOSIS — R74.01 TRANSAMINITIS: ICD-10-CM

## 2024-11-20 LAB
ALBUMIN SERPL-MCNC: 4.8 G/DL (ref 3.2–4.8)
ALBUMIN/GLOB SERPL: 1.6 {RATIO} (ref 1–2)
ALP LIVER SERPL-CCNC: 87 U/L
ALT SERPL-CCNC: 50 U/L
ANION GAP SERPL CALC-SCNC: 7 MMOL/L (ref 0–18)
AST SERPL-CCNC: 31 U/L (ref ?–34)
BILIRUB SERPL-MCNC: 0.5 MG/DL (ref 0.3–1.2)
BUN BLD-MCNC: 16 MG/DL (ref 9–23)
CALCIUM BLD-MCNC: 9.7 MG/DL (ref 8.7–10.4)
CHLORIDE SERPL-SCNC: 105 MMOL/L (ref 98–112)
CHOLEST SERPL-MCNC: 261 MG/DL (ref ?–200)
CO2 SERPL-SCNC: 26 MMOL/L (ref 21–32)
CREAT BLD-MCNC: 1.05 MG/DL
DEPRECATED HBV CORE AB SER IA-ACNC: 213 NG/ML
EGFRCR SERPLBLD CKD-EPI 2021: 94 ML/MIN/1.73M2 (ref 60–?)
FASTING PATIENT LIPID ANSWER: NO
FASTING STATUS PATIENT QL REPORTED: NO
GLOBULIN PLAS-MCNC: 3 G/DL (ref 2–3.5)
GLUCOSE BLD-MCNC: 80 MG/DL (ref 70–99)
HAV IGM SER QL: NONREACTIVE
HBV CORE IGM SER QL: NONREACTIVE
HBV SURFACE AG SERPL QL IA: NONREACTIVE
HCV AB SERPL QL IA: NONREACTIVE
HDLC SERPL-MCNC: 42 MG/DL (ref 40–59)
LDLC SERPL CALC-MCNC: 142 MG/DL (ref ?–100)
NONHDLC SERPL-MCNC: 219 MG/DL (ref ?–130)
OSMOLALITY SERPL CALC.SUM OF ELEC: 286 MOSM/KG (ref 275–295)
POTASSIUM SERPL-SCNC: 3.7 MMOL/L (ref 3.5–5.1)
PROT SERPL-MCNC: 7.8 G/DL (ref 5.7–8.2)
SODIUM SERPL-SCNC: 138 MMOL/L (ref 136–145)
TRIGL SERPL-MCNC: 416 MG/DL (ref 30–149)
VLDLC SERPL CALC-MCNC: 80 MG/DL (ref 0–30)

## 2024-11-20 PROCEDURE — 82728 ASSAY OF FERRITIN: CPT

## 2024-11-20 PROCEDURE — 80053 COMPREHEN METABOLIC PANEL: CPT

## 2024-11-20 PROCEDURE — 80061 LIPID PANEL: CPT

## 2024-11-20 PROCEDURE — 80074 ACUTE HEPATITIS PANEL: CPT

## 2024-11-20 PROCEDURE — 36415 COLL VENOUS BLD VENIPUNCTURE: CPT

## 2024-11-21 ENCOUNTER — OFFICE VISIT (OUTPATIENT)
Dept: INTERNAL MEDICINE CLINIC | Facility: CLINIC | Age: 39
End: 2024-11-21
Payer: COMMERCIAL

## 2024-11-21 VITALS
HEIGHT: 65.98 IN | WEIGHT: 187 LBS | BODY MASS INDEX: 30.05 KG/M2 | TEMPERATURE: 97 F | OXYGEN SATURATION: 97 % | SYSTOLIC BLOOD PRESSURE: 112 MMHG | RESPIRATION RATE: 18 BRPM | HEART RATE: 72 BPM | DIASTOLIC BLOOD PRESSURE: 74 MMHG

## 2024-11-21 DIAGNOSIS — J18.9 PNEUMONIA OF RIGHT MIDDLE LOBE DUE TO INFECTIOUS ORGANISM: Primary | ICD-10-CM

## 2024-11-21 DIAGNOSIS — Z13.0 SCREENING FOR BLOOD DISEASE: ICD-10-CM

## 2024-11-21 DIAGNOSIS — E78.2 MIXED HYPERLIPIDEMIA: ICD-10-CM

## 2024-11-21 DIAGNOSIS — Z13.29 THYROID DISORDER SCREEN: ICD-10-CM

## 2024-11-21 DIAGNOSIS — R74.01 TRANSAMINITIS: ICD-10-CM

## 2024-11-21 PROCEDURE — 99214 OFFICE O/P EST MOD 30 MIN: CPT | Performed by: PHYSICIAN ASSISTANT

## 2024-11-21 PROCEDURE — 3078F DIAST BP <80 MM HG: CPT | Performed by: PHYSICIAN ASSISTANT

## 2024-11-21 PROCEDURE — 3008F BODY MASS INDEX DOCD: CPT | Performed by: PHYSICIAN ASSISTANT

## 2024-11-21 PROCEDURE — G2211 COMPLEX E/M VISIT ADD ON: HCPCS | Performed by: PHYSICIAN ASSISTANT

## 2024-11-21 PROCEDURE — 3074F SYST BP LT 130 MM HG: CPT | Performed by: PHYSICIAN ASSISTANT

## 2024-11-21 RX ORDER — ATORVASTATIN CALCIUM 10 MG/1
10 TABLET, FILM COATED ORAL NIGHTLY
Qty: 90 TABLET | Refills: 0 | Status: SHIPPED | OUTPATIENT
Start: 2024-11-21

## 2024-11-21 RX ORDER — BENZONATATE 200 MG/1
200 CAPSULE ORAL 3 TIMES DAILY PRN
Qty: 20 CAPSULE | Refills: 0 | Status: SHIPPED | OUTPATIENT
Start: 2024-11-21

## 2025-01-19 DIAGNOSIS — F33.42 RECURRENT MAJOR DEPRESSIVE DISORDER, IN FULL REMISSION: ICD-10-CM

## 2025-01-22 RX ORDER — DULOXETIN HYDROCHLORIDE 60 MG/1
60 CAPSULE, DELAYED RELEASE ORAL 2 TIMES DAILY
Qty: 180 CAPSULE | Refills: 0 | Status: SHIPPED | OUTPATIENT
Start: 2025-01-22

## 2025-01-22 NOTE — TELEPHONE ENCOUNTER
Please remind pt due for labs, CPE and follow up X-ray after pneumonia.  Pt has orders for labs and X-ray in system.

## 2025-01-22 NOTE — TELEPHONE ENCOUNTER
Please Review. Protocol Failed; No Protocol     Requested Prescriptions   Pending Prescriptions Disp Refills    DULOXETINE 60 MG Oral Cap DR Particles [Pharmacy Med Name: Duloxetine Hydrochloride Ec 60 Mg Cap Risi] 180 capsule 0     Sig: TAKE ONE CAPSULE BY MOUTH TWICE DAILY       Psychiatric Non-Scheduled (Anti-Anxiety) Failed - 1/22/2025 12:20 PM        Failed - Depression Screening completed within the past 12 months        Passed - In person appointment or virtual visit in the past 6 mos or appointment in next 3 mos     Recent Outpatient Visits              2 months ago Pneumonia of right middle lobe due to infectious organism    [facility], [address] [place], [place] [address]Juani Christina, PA-C    Office Visit    9 months ago Transaminitis    [facility], [address] [place], [place] [address]Juani Christina, PA-C    Office Visit    1 year ago Annual physical exam    [facility], [address] [place], [place] [address]Juani Christina, PA-C    Office Visit    1 year ago Recurrent major depressive disorder, in full remission ([facility])    [facility], [address] [place], [place] [address]Juani Christina, PA-C    Office Visit    2 years ago Annual physical exam    [facility], [address] [place], [place] [address]Juani Christina, PA-C    Office Visit                      Passed - Medication is active on med list                 Recent Outpatient Visits              2 months ago Pneumonia of right middle lobe due to infectious organism    [facility], [address] [place], [place] [address]Juani Christina, PA-C    Office Visit    9 months ago Transaminitis    [facility], [address] [place] [address] Mary Andersen PA-C    Office Visit    1 year ago Annual physical exam    [facility], [address] [place], [place] [address]Juani Christina, PA-C    Office Visit    1 year ago Recurrent major depressive disorder, in full remission (HCC)    [facility], [address] [place] [address], Mary Andersen PA-C    Office Visit    2 years ago Annual physical exam    [facility], [address] [place] [address], Mary Andersen PA-C    Office Visit

## 2025-02-04 ENCOUNTER — TELEPHONE (OUTPATIENT)
Dept: INTERNAL MEDICINE CLINIC | Facility: CLINIC | Age: 40
End: 2025-02-04

## 2025-02-05 NOTE — TELEPHONE ENCOUNTER
Patient scheduled on below for physical'  Future Appointments   Date Time Provider Department Center   2/19/2025 11:30 AM Mary Grubbs PA-C [facility]

## 2025-02-12 ENCOUNTER — LAB ENCOUNTER (OUTPATIENT)
Dept: LAB | Age: 40
End: 2025-02-12
Attending: PHYSICIAN ASSISTANT
Payer: COMMERCIAL

## 2025-02-12 DIAGNOSIS — E78.2 MIXED HYPERLIPIDEMIA: ICD-10-CM

## 2025-02-12 DIAGNOSIS — Z13.0 SCREENING FOR BLOOD DISEASE: ICD-10-CM

## 2025-02-12 DIAGNOSIS — Z13.29 THYROID DISORDER SCREEN: ICD-10-CM

## 2025-02-12 LAB
ALBUMIN SERPL-MCNC: 4.4 G/DL (ref 3.2–4.8)
ALBUMIN/GLOB SERPL: 1.7 {RATIO} (ref 1–2)
ALP LIVER SERPL-CCNC: 64 U/L
ALT SERPL-CCNC: 54 U/L
ANION GAP SERPL CALC-SCNC: 7 MMOL/L (ref 0–18)
AST SERPL-CCNC: 31 U/L (ref ?–34)
BASOPHILS # BLD AUTO: 0.05 X10(3) UL (ref 0–0.2)
BASOPHILS NFR BLD AUTO: 0.8 %
BILIRUB SERPL-MCNC: 0.5 MG/DL (ref 0.3–1.2)
BUN BLD-MCNC: 16 MG/DL (ref 9–23)
CALCIUM BLD-MCNC: 9.7 MG/DL (ref 8.7–10.6)
CHLORIDE SERPL-SCNC: 107 MMOL/L (ref 98–112)
CHOLEST SERPL-MCNC: 174 MG/DL (ref ?–200)
CO2 SERPL-SCNC: 26 MMOL/L (ref 21–32)
CREAT BLD-MCNC: 1.14 MG/DL
EGFRCR SERPLBLD CKD-EPI 2021: 84 ML/MIN/1.73M2 (ref 60–?)
EOSINOPHIL # BLD AUTO: 0.18 X10(3) UL (ref 0–0.7)
EOSINOPHIL NFR BLD AUTO: 3 %
ERYTHROCYTE [DISTWIDTH] IN BLOOD BY AUTOMATED COUNT: 12 %
FASTING PATIENT LIPID ANSWER: YES
FASTING STATUS PATIENT QL REPORTED: YES
GLOBULIN PLAS-MCNC: 2.6 G/DL (ref 2–3.5)
GLUCOSE BLD-MCNC: 98 MG/DL (ref 70–99)
HCT VFR BLD AUTO: 44.8 %
HDLC SERPL-MCNC: 39 MG/DL (ref 40–59)
HGB BLD-MCNC: 15.2 G/DL
IMM GRANULOCYTES # BLD AUTO: 0.02 X10(3) UL (ref 0–1)
IMM GRANULOCYTES NFR BLD: 0.3 %
LDLC SERPL CALC-MCNC: 105 MG/DL (ref ?–100)
LYMPHOCYTES # BLD AUTO: 2.38 X10(3) UL (ref 1–4)
LYMPHOCYTES NFR BLD AUTO: 39.9 %
MCH RBC QN AUTO: 29.6 PG (ref 26–34)
MCHC RBC AUTO-ENTMCNC: 33.9 G/DL (ref 31–37)
MCV RBC AUTO: 87.3 FL
MONOCYTES # BLD AUTO: 0.48 X10(3) UL (ref 0.1–1)
MONOCYTES NFR BLD AUTO: 8.1 %
NEUTROPHILS # BLD AUTO: 2.85 X10 (3) UL (ref 1.5–7.7)
NEUTROPHILS # BLD AUTO: 2.85 X10(3) UL (ref 1.5–7.7)
NEUTROPHILS NFR BLD AUTO: 47.9 %
NONHDLC SERPL-MCNC: 135 MG/DL (ref ?–130)
OSMOLALITY SERPL CALC.SUM OF ELEC: 291 MOSM/KG (ref 275–295)
PLATELET # BLD AUTO: 258 10(3)UL (ref 150–450)
POTASSIUM SERPL-SCNC: 4.2 MMOL/L (ref 3.5–5.1)
PROT SERPL-MCNC: 7 G/DL (ref 5.7–8.2)
RBC # BLD AUTO: 5.13 X10(6)UL
SODIUM SERPL-SCNC: 140 MMOL/L (ref 136–145)
TRIGL SERPL-MCNC: 174 MG/DL (ref 30–149)
TSI SER-ACNC: 1.65 UIU/ML (ref 0.55–4.78)
VLDLC SERPL CALC-MCNC: 29 MG/DL (ref 0–30)
WBC # BLD AUTO: 6 X10(3) UL (ref 4–11)

## 2025-02-12 PROCEDURE — 80061 LIPID PANEL: CPT | Performed by: PHYSICIAN ASSISTANT

## 2025-02-12 PROCEDURE — 80050 GENERAL HEALTH PANEL: CPT | Performed by: PHYSICIAN ASSISTANT

## 2025-02-13 DIAGNOSIS — F41.1 GENERALIZED ANXIETY DISORDER: ICD-10-CM

## 2025-02-17 RX ORDER — BUSPIRONE HYDROCHLORIDE 10 MG/1
10 TABLET ORAL 2 TIMES DAILY
Qty: 180 TABLET | Refills: 1 | Status: SHIPPED | OUTPATIENT
Start: 2025-02-17

## 2025-02-17 NOTE — TELEPHONE ENCOUNTER
Please review; protocol failed/No Protocol    Last Office Visit: 11/21/2024    Requested Prescriptions   Pending Prescriptions Disp Refills    BUSPIRONE 10 MG Oral Tab [Pharmacy Med Name: Buspirone Hydrochloride 10 Mg Tab Unic] 180 tablet 0     Sig: TAKE ONE TABLET BY MOUTH TWICE DAILY       There is no refill protocol information for this order        Future Appointments         Provider Department Appt Notes    In 2 days Mary Grubbs PA-C [facility], [address] [place] [address] cpe last 1/2024          Recent Outpatient Visits              2 months ago Pneumonia of right middle lobe due to infectious organism    [facility], [address] [place] [address] Mary Grubbs PA-C    Office Visit    10 months ago Transaminitis    [facility], [address] [place] [address] Mary Grubbs PA-C    Office Visit    1 year ago Annual physical exam    [facility], [address] [place], [place] [address]Mary Mandujano PA-C    Office Visit    1 year ago Recurrent major depressive disorder, in full remission    [facility], [address] [place] [address] Mary Grubbs PA-C    Office Visit    2 years ago Annual physical exam    [address]    Office Visit

## 2025-02-18 NOTE — PROGRESS NOTES
Chief Complaint   Patient presents with    Physical     Rm 2 kb       HPI:  Pt presents for annual physical.  Pt is trying to exercise, mostly getting lots of physical activity with walking dogs and running after 3 boys.        Review of Systems   Constitutional: Negative for fever, chills and fatigue. No distress.  HENT: Negative for hearing loss, congestion, sore throat, neck pain.  Saw dentist for cavity recently.     Eyes: Negative for pain and visual disturbance.   Respiratory: Negative for cough, chest tightness, shortness of breath  Cardiovascular: Negative for chest pain, palpitations and leg swelling.   Gastrointestinal: Negative for nausea, vomiting, abdominal pain, diarrhea, blood in stool  Genitourinary: Negative for dysuria, hematuria and difficulty urinating.   Musculoskeletal: Negative for myalgias, back pain, joint swelling, arthralgias and gait problem.   Skin: Negative for color change and rash.   Neurological: Negative for dizziness, syncope, weakness, numbness, tingling and headaches.   Hematological: Negative for adenopathy. Does not bruise/bleed easily.   Psychiatric/Behavioral: See above.      Patient Active Problem List   Diagnosis    Generalized anxiety disorder - compliant with meds, controlled.  No new complaints, wishes to continue current meds.      Recurrent major depressive disorder, in full remission - compliant with meds, controlled.  Does not desire any changes at this time.    Mixed hyperlipidemia - improved on Atorvastatin, pt is compliant and tolerating without SEs/complaints.    Transaminitis - persistent, mild and stable over the last year.  Normal ferritin and no evidence of viral hepatitis.  Have not done abd US yet.         History reviewed. No pertinent past medical history.  History reviewed. No pertinent surgical history.  History reviewed. No pertinent family history.  Social History     Socioeconomic History    Marital status:    Tobacco Use    Smoking status:  Never    Smokeless tobacco: Never       Current Outpatient Medications   Medication Sig Dispense Refill    busPIRone 10 MG Oral Tab Take 1 tablet (10 mg total) by mouth 2 (two) times daily. 180 tablet 1    DULoxetine 60 MG Oral Cap DR Particles Take 1 capsule (60 mg total) by mouth 2 (two) times daily. 180 capsule 0    atorvastatin 10 MG Oral Tab Take 1 tablet (10 mg total) by mouth nightly. 90 tablet 0       Allergies  Allergies[1]    Health Maintenance    Immunization History   Administered Date(s) Administered    FLUZONE 6 months and older PFS 0.5 ml (50207) 01/17/2024    TDAP 01/17/2024   Pended Date(s) Pended    Influenza Vaccine, trivalent (IIV3), PF 0.5mL (76312) 02/19/2025     Dental Visits:  Yes    Physical Exam  /64   Pulse 78   Resp 18   Ht 5' 6\" (1.676 m)   Wt 190 lb (86.2 kg)   SpO2 98%   BMI 30.67 kg/m²   Constitutional: Oriented to person, place, and time. No distress.   HEENT:  Normocephalic and atraumatic. Tympanic membranes normal.  Nose normal. Oropharynx is clear and moist.   Eyes: Conjunctivae are normal. PERRLA. No scleral icterus.   Neck: Normal range of motion. Neck supple. No carotid bruits bilaterally. No edema present.  Cardiovascular: Normal rate, regular rhythm and intact distal pulses.  No murmur, rubs or gallops.   Pulmonary/Chest: Effort normal and breath sounds normal. No respiratory distress. No wheezes, rhonchi or rales  Abdominal: Soft. Bowel sounds are normal. Non tender, no masses, no organomegaly or hernias.  Musculoskeletal: Normal range of motion. No edema and no tenderness.  No effusions.  Lymphadenopathy: No cervical adenopathy.   Neurological: Normal reflexes. No cranial nerve deficit or sensory deficit. Normal muscle tone. Coordination normal.   Skin: Skin is warm and dry. No rash noted. No erythema. No pallor.   Psychiatric: Normal mood and affect.     [facility] Lab Encounter on 02/12/2025   Component Date Value Ref Range Status    TSH 02/12/2025 1.654  0.550 -  4.780 uIU/mL Final    Glucose 02/12/2025 98  70 - 99 mg/dL Final    Sodium 02/12/2025 140  136 - 145 mmol/L Final    Potassium 02/12/2025 4.2  3.5 - 5.1 mmol/L Final    Chloride 02/12/2025 107  98 - 112 mmol/L Final    CO2 02/12/2025 26.0  21.0 - 32.0 mmol/L Final    Anion Gap 02/12/2025 7  0 - 18 mmol/L Final    BUN 02/12/2025 16  9 - 23 mg/dL Final    Creatinine 02/12/2025 1.14  0.70 - 1.30 mg/dL Final    Calcium, Total 02/12/2025 9.7  8.7 - 10.6 mg/dL Final    Calculated Osmolality 02/12/2025 291  275 - 295 mOsm/kg Final    eGFR-Cr 02/12/2025 84  >=60 mL/min/1.73m2 Final    AST 02/12/2025 31  <34 U/L Final    ALT 02/12/2025 54 (H)  10 - 49 U/L Final    Alkaline Phosphatase 02/12/2025 64  45 - 117 U/L Final    Bilirubin, Total 02/12/2025 0.5  0.3 - 1.2 mg/dL Final    Total Protein 02/12/2025 7.0  5.7 - 8.2 g/dL Final    Albumin 02/12/2025 4.4  3.2 - 4.8 g/dL Final    Globulin  02/12/2025 2.6  2.0 - 3.5 g/dL Final    A/G Ratio 02/12/2025 1.7  1.0 - 2.0 Final    Patient Fasting for CMP? 02/12/2025 Yes   Final    WBC 02/12/2025 6.0  4.0 - 11.0 x10(3) uL Final    RBC 02/12/2025 5.13  4.30 - 5.70 x10(6)uL Final    HGB 02/12/2025 15.2  13.0 - 17.5 g/dL Final    HCT 02/12/2025 44.8  39.0 - 53.0 % Final    PLT 02/12/2025 258.0  150.0 - 450.0 10(3)uL Final    MCV 02/12/2025 87.3  80.0 - 100.0 fL Final    MCH 02/12/2025 29.6  26.0 - 34.0 pg Final    MCHC 02/12/2025 33.9  31.0 - 37.0 g/dL Final    RDW 02/12/2025 12.0  % Final    Neutrophil Absolute Prelim 02/12/2025 2.85  1.50 - 7.70 x10 (3) uL Final    Neutrophil Absolute 02/12/2025 2.85  1.50 - 7.70 x10(3) uL Final    Lymphocyte Absolute 02/12/2025 2.38  1.00 - 4.00 x10(3) uL Final    Monocyte Absolute 02/12/2025 0.48  0.10 - 1.00 x10(3) uL Final    Eosinophil Absolute 02/12/2025 0.18  0.00 - 0.70 x10(3) uL Final    Basophil Absolute 02/12/2025 0.05  0.00 - 0.20 x10(3) uL Final    Immature Granulocyte Absolute 02/12/2025 0.02  0.00 - 1.00 x10(3) uL Final    Neutrophil %  02/12/2025 47.9  % Final    Lymphocyte % 02/12/2025 39.9  % Final    Monocyte % 02/12/2025 8.1  % Final    Eosinophil % 02/12/2025 3.0  % Final    Basophil % 02/12/2025 0.8  % Final    Immature Granulocyte % 02/12/2025 0.3  % Final    Cholesterol, Total 02/12/2025 174  <200 mg/dL Final    HDL Cholesterol 02/12/2025 39 (L)  40 - 59 mg/dL Final    Triglycerides 02/12/2025 174 (H)  30 - 149 mg/dL Final    LDL Cholesterol 02/12/2025 105 (H)  <100 mg/dL Final    VLDL 02/12/2025 29  0 - 30 mg/dL Final    Non HDL Chol 02/12/2025 135 (H)  <130 mg/dL Final    Patient Fasting for Lipid? 02/12/2025 Yes   Final       A/P:    Encounter Diagnoses   Name Primary?    Annual physical exam - Flu shot today.  Will pursue COVID booster at pharmacy.  Encouraged continued efforts at diet, exercise and wt loss.   Yes    Mixed hyperlipidemia - much improved on Atorvastatin, continue.       Transaminitis - persistent mild elevation.  Lab w/u has been normal.  Check abd US, suspect fatty liver.       Generalized anxiety disorder - controlled on current meds, continue.     Recurrent major depressive disorder, in full remission - controlled on current meds, continue.     Need for influenza vaccination        Orders Placed This Encounter   Procedures    Fluzone trivalent vaccine, PF 0.5mL, 6mo+ (83751)       Meds & Refills for this Visit:  Requested Prescriptions      No prescriptions requested or ordered in this encounter       Imaging & Consults:  INFLUENZA VACCINE, TRI, PRESERV FREE, 0.5 ML  US ABDOMEN LIMITED (CPT=76705)      Return in about 1 year (around 2/19/2026) for Annual physical.    There are no Patient Instructions on file for this visit.    All questions were answered and the patient understands the plan.                  [1] No Known Allergies

## 2025-02-19 ENCOUNTER — OFFICE VISIT (OUTPATIENT)
Dept: INTERNAL MEDICINE CLINIC | Facility: CLINIC | Age: 40
End: 2025-02-19
Payer: COMMERCIAL

## 2025-02-19 VITALS
RESPIRATION RATE: 18 BRPM | DIASTOLIC BLOOD PRESSURE: 64 MMHG | BODY MASS INDEX: 30.53 KG/M2 | HEIGHT: 66 IN | OXYGEN SATURATION: 98 % | HEART RATE: 78 BPM | SYSTOLIC BLOOD PRESSURE: 120 MMHG | WEIGHT: 190 LBS

## 2025-02-19 DIAGNOSIS — F41.1 GENERALIZED ANXIETY DISORDER: ICD-10-CM

## 2025-02-19 DIAGNOSIS — Z00.00 ANNUAL PHYSICAL EXAM: Primary | ICD-10-CM

## 2025-02-19 DIAGNOSIS — Z23 NEED FOR INFLUENZA VACCINATION: ICD-10-CM

## 2025-02-19 DIAGNOSIS — R74.01 TRANSAMINITIS: ICD-10-CM

## 2025-02-19 DIAGNOSIS — E78.2 MIXED HYPERLIPIDEMIA: ICD-10-CM

## 2025-02-19 DIAGNOSIS — F33.42 RECURRENT MAJOR DEPRESSIVE DISORDER, IN FULL REMISSION: ICD-10-CM

## 2025-02-22 DIAGNOSIS — E78.2 MIXED HYPERLIPIDEMIA: ICD-10-CM

## 2025-02-26 RX ORDER — ATORVASTATIN CALCIUM 10 MG/1
10 TABLET, FILM COATED ORAL NIGHTLY
Qty: 90 TABLET | Refills: 3 | Status: SHIPPED | OUTPATIENT
Start: 2025-02-26

## 2025-05-10 DIAGNOSIS — F33.42 RECURRENT MAJOR DEPRESSIVE DISORDER, IN FULL REMISSION: ICD-10-CM

## 2025-05-12 RX ORDER — DULOXETIN HYDROCHLORIDE 60 MG/1
60 CAPSULE, DELAYED RELEASE ORAL 2 TIMES DAILY
Qty: 180 CAPSULE | Refills: 3 | Status: SHIPPED | OUTPATIENT
Start: 2025-05-12

## 2025-08-15 DIAGNOSIS — F41.1 GENERALIZED ANXIETY DISORDER: ICD-10-CM

## 2025-08-18 RX ORDER — BUSPIRONE HYDROCHLORIDE 10 MG/1
10 TABLET ORAL 2 TIMES DAILY
Qty: 180 TABLET | Refills: 1 | Status: SHIPPED | OUTPATIENT
Start: 2025-08-18

## (undated) NOTE — LETTER
2/4/2025    Hernán Kwong  [address] [address]  12/10/1985  [id number]      Dear Hernán Kwong,      My office staff has attempted to contact you at my request.  It is important for your health and well-being that you contact my office for the following reason(s):      ___ Schedule office visit/office procedure    ___ Schedule laboratory/radiology testing    ___ Call to discuss results of testing and/or be advised of treatment changes      ___      Other:        I believe that the relationship between a physician and their patient is one of communication and mutual cooperation.  It is important for the patient to follow through with the recommendations made by their Doctor in order to achieve the best possible outcome. Please contact our office at [phone].      Sincerely,      [facility]         Hernán Kwong         Sincerely,    Mary Grubbs PA-C